# Patient Record
Sex: MALE | URBAN - METROPOLITAN AREA
[De-identification: names, ages, dates, MRNs, and addresses within clinical notes are randomized per-mention and may not be internally consistent; named-entity substitution may affect disease eponyms.]

---

## 2018-12-19 ENCOUNTER — APPOINTMENT (RX ONLY)
Dept: URBAN - METROPOLITAN AREA CLINIC 57 | Facility: CLINIC | Age: 53
Setting detail: DERMATOLOGY
End: 2018-12-19

## 2018-12-19 DIAGNOSIS — L57.8 OTHER SKIN CHANGES DUE TO CHRONIC EXPOSURE TO NONIONIZING RADIATION: ICD-10-CM

## 2018-12-19 DIAGNOSIS — L81.4 OTHER MELANIN HYPERPIGMENTATION: ICD-10-CM

## 2018-12-19 DIAGNOSIS — L30.8 OTHER SPECIFIED DERMATITIS: ICD-10-CM

## 2018-12-19 DIAGNOSIS — L91.8 OTHER HYPERTROPHIC DISORDERS OF THE SKIN: ICD-10-CM

## 2018-12-19 DIAGNOSIS — L21.8 OTHER SEBORRHEIC DERMATITIS: ICD-10-CM

## 2018-12-19 DIAGNOSIS — D22 MELANOCYTIC NEVI: ICD-10-CM

## 2018-12-19 PROBLEM — E13.9 OTHER SPECIFIED DIABETES MELLITUS WITHOUT COMPLICATIONS: Status: ACTIVE | Noted: 2018-12-19

## 2018-12-19 PROBLEM — L85.3 XEROSIS CUTIS: Status: ACTIVE | Noted: 2018-12-19

## 2018-12-19 PROBLEM — D22.5 MELANOCYTIC NEVI OF TRUNK: Status: ACTIVE | Noted: 2018-12-19

## 2018-12-19 PROBLEM — E78.5 HYPERLIPIDEMIA, UNSPECIFIED: Status: ACTIVE | Noted: 2018-12-19

## 2018-12-19 PROCEDURE — ? PRESCRIPTION

## 2018-12-19 PROCEDURE — ? COUNSELING

## 2018-12-19 PROCEDURE — 99213 OFFICE O/P EST LOW 20 MIN: CPT

## 2018-12-19 PROCEDURE — ? ADDITIONAL NOTES

## 2018-12-19 RX ORDER — KETOCONAZOLE 20 MG/G
CREAM TOPICAL BID
Qty: 1 | Refills: 6 | Status: ERX | COMMUNITY
Start: 2018-12-19

## 2018-12-19 RX ORDER — TRIAMCINOLONE ACETONIDE 1 MG/G
OINTMENT TOPICAL
Qty: 1 | Refills: 1 | Status: ERX | COMMUNITY
Start: 2018-12-19

## 2018-12-19 RX ADMIN — KETOCONAZOLE: 20 CREAM TOPICAL at 00:00

## 2018-12-19 RX ADMIN — TRIAMCINOLONE ACETONIDE 1: 1 OINTMENT TOPICAL at 00:00

## 2018-12-19 ASSESSMENT — LOCATION DETAILED DESCRIPTION DERM
LOCATION DETAILED: RIGHT RADIAL PALM
LOCATION DETAILED: PERIUMBILICAL SKIN
LOCATION DETAILED: SUPERIOR MID FOREHEAD
LOCATION DETAILED: LEFT RADIAL PALM
LOCATION DETAILED: INFERIOR THORACIC SPINE
LOCATION DETAILED: SUPERIOR THORACIC SPINE
LOCATION DETAILED: GLABELLA

## 2018-12-19 ASSESSMENT — LOCATION SIMPLE DESCRIPTION DERM
LOCATION SIMPLE: GLABELLA
LOCATION SIMPLE: SUPERIOR FOREHEAD
LOCATION SIMPLE: LEFT HAND
LOCATION SIMPLE: UPPER BACK
LOCATION SIMPLE: ABDOMEN
LOCATION SIMPLE: RIGHT HAND

## 2018-12-19 ASSESSMENT — LOCATION ZONE DERM
LOCATION ZONE: FACE
LOCATION ZONE: TRUNK
LOCATION ZONE: FACE
LOCATION ZONE: TRUNK
LOCATION ZONE: HAND

## 2018-12-19 NOTE — PROCEDURE: ADDITIONAL NOTES
Detail Level: Simple
Additional Notes: Patient is to apply Vaseline and gloves qhs when not flaring.

## 2019-03-20 ENCOUNTER — APPOINTMENT (RX ONLY)
Dept: URBAN - METROPOLITAN AREA CLINIC 56 | Facility: CLINIC | Age: 54
Setting detail: DERMATOLOGY
End: 2019-03-20

## 2019-03-20 DIAGNOSIS — L21.8 OTHER SEBORRHEIC DERMATITIS: ICD-10-CM

## 2019-03-20 PROBLEM — L30.9 DERMATITIS, UNSPECIFIED: Status: ACTIVE | Noted: 2019-03-20

## 2019-03-20 PROBLEM — K21.9 GASTRO-ESOPHAGEAL REFLUX DISEASE WITHOUT ESOPHAGITIS: Status: ACTIVE | Noted: 2019-03-20

## 2019-03-20 PROCEDURE — ? ADDITIONAL NOTES

## 2019-03-20 PROCEDURE — 99213 OFFICE O/P EST LOW 20 MIN: CPT

## 2019-03-20 PROCEDURE — ? COUNSELING

## 2019-03-20 ASSESSMENT — LOCATION ZONE DERM: LOCATION ZONE: FACE

## 2019-03-20 ASSESSMENT — LOCATION SIMPLE DESCRIPTION DERM: LOCATION SIMPLE: SUBMENTAL CHIN

## 2019-03-20 ASSESSMENT — LOCATION DETAILED DESCRIPTION DERM: LOCATION DETAILED: SUBMENTAL CHIN

## 2019-03-20 NOTE — PROCEDURE: ADDITIONAL NOTES
Additional Notes: Use a Triamcinolone 0.1% cream twice daily for one week and after one week,  use Ketoconazole 2% cream twice daily.
Detail Level: Simple

## 2019-05-29 ENCOUNTER — APPOINTMENT (RX ONLY)
Dept: URBAN - METROPOLITAN AREA CLINIC 56 | Facility: CLINIC | Age: 54
Setting detail: DERMATOLOGY
End: 2019-05-29

## 2019-05-29 DIAGNOSIS — L21.8 OTHER SEBORRHEIC DERMATITIS: ICD-10-CM

## 2019-05-29 DIAGNOSIS — M71 OTHER BURSOPATHIES: ICD-10-CM

## 2019-05-29 PROBLEM — M71.341 OTHER BURSAL CYST, RIGHT HAND: Status: ACTIVE | Noted: 2019-05-29

## 2019-05-29 PROBLEM — L30.9 DERMATITIS, UNSPECIFIED: Status: ACTIVE | Noted: 2019-05-29

## 2019-05-29 PROCEDURE — 11102 TANGNTL BX SKIN SINGLE LES: CPT

## 2019-05-29 PROCEDURE — ? INVENTORY

## 2019-05-29 PROCEDURE — 99213 OFFICE O/P EST LOW 20 MIN: CPT | Mod: 25

## 2019-05-29 PROCEDURE — ? BIOPSY BY SHAVE METHOD

## 2019-05-29 PROCEDURE — ? COUNSELING

## 2019-05-29 ASSESSMENT — LOCATION DETAILED DESCRIPTION DERM
LOCATION DETAILED: RIGHT CHIN
LOCATION DETAILED: RIGHT INDEX DISTAL INTERPHALANGEAL JOINT

## 2019-05-29 ASSESSMENT — LOCATION SIMPLE DESCRIPTION DERM
LOCATION SIMPLE: RIGHT INDEX FINGER
LOCATION SIMPLE: CHIN

## 2019-05-29 ASSESSMENT — LOCATION ZONE DERM
LOCATION ZONE: FINGER
LOCATION ZONE: FACE

## 2019-05-29 NOTE — PROCEDURE: MIPS QUALITY
Quality 402: Tobacco Use And Help With Quitting Among Adolescents: Patient screened for tobacco and is an ex-smoker
Quality 431: Preventive Care And Screening: Unhealthy Alcohol Use - Screening: Patient screened for unhealthy alcohol use using a single question and scores less than 2 times per year
Detail Level: Detailed
Quality 130: Documentation Of Current Medications In The Medical Record: Current Medications Documented

## 2019-05-29 NOTE — PROCEDURE: BIOPSY BY SHAVE METHOD
Additional Anesthesia Volume In Cc (Will Not Render If 0): 0
Render Post-Care Instructions In Note?: no
Cryotherapy Text: The wound bed was treated with cryotherapy after the biopsy was performed.
Lab Facility: [id number]
Depth Of Biopsy: dermis
Wound Care: Petrolatum
Electrodesiccation Text: The wound bed was treated with electrodesiccation after the biopsy was performed.
Biopsy Type: H and E
Anesthesia Volume In Cc (Will Not Render If 0): 0.5
Type Of Destruction Used: Electrodesiccation and Curettage
Electrodesiccation And Curettage Text: The wound bed was treated with electrodesiccation and curettage after the biopsy was performed.
Billing Type: Third-Party Bill
Post-Care Instructions: I reviewed with the patient in detail post-care instructions. Patient is to  cleanse the biopsy site with mild soap and water twice a day, and then apply Vaseline (petroleum jelly) twice daily until healed.
Was A Bandage Applied: Yes
Dressing: Band-Aid
Hemostasis: Drysol and Monsel's
Silver Nitrate Text: The wound bed was treated with silver nitrate after the biopsy was performed.
Notification Instructions: Patient will be notified of biopsy results. However, patient instructed to call the office if not contacted within 2 weeks.
Lab: [id number]
Biopsy Method: Personna blade
Detail Level: Detailed
Curettage Text: The wound bed was treated with curettage after the biopsy was performed.
Anesthesia Type: 1% lidocaine with 1:200,000 epinephrine
Consent: Written consent was obtained and risks were reviewed including but not limited to scarring, infection, bleeding, scabbing, incomplete removal, nerve damage and allergy to anesthesia.

## 2019-06-10 ENCOUNTER — RX ONLY (OUTPATIENT)
Age: 54
Setting detail: RX ONLY
End: 2019-06-10

## 2019-06-10 RX ORDER — KETOCONAZOLE 20.5 MG/ML
SHAMPOO, SUSPENSION TOPICAL
Qty: 1 | Refills: 6 | Status: ERX | COMMUNITY
Start: 2019-06-10

## 2019-06-10 RX ORDER — FLUCONAZOLE 200 MG/1
TABLET ORAL
Qty: 4 | Refills: 0 | Status: ERX | COMMUNITY
Start: 2019-06-10

## 2019-06-18 ENCOUNTER — RX ONLY (OUTPATIENT)
Age: 54
Setting detail: RX ONLY
End: 2019-06-18

## 2019-06-18 RX ORDER — EMOLLIENT COMBINATION NO.53
CREAM (GRAM) TOPICAL
Qty: 1 | Refills: 6 | Status: ERX | COMMUNITY
Start: 2019-06-18

## 2019-06-18 RX ORDER — KETOCONAZOLE 20.5 MG/ML
SHAMPOO, SUSPENSION TOPICAL
Qty: 1 | Refills: 6 | Status: ERX

## 2019-06-18 RX ORDER — FLUCONAZOLE 200 MG/1
TABLET ORAL
Qty: 4 | Refills: 0 | Status: ERX

## 2020-04-24 ENCOUNTER — RX ONLY (OUTPATIENT)
Age: 55
Setting detail: RX ONLY
End: 2020-04-24

## 2020-04-24 RX ORDER — KETOCONAZOLE 20 MG/G
1 CREAM TOPICAL BID
Qty: 1 | Refills: 6 | Status: ERX

## 2021-01-13 ENCOUNTER — APPOINTMENT (RX ONLY)
Dept: URBAN - METROPOLITAN AREA CLINIC 57 | Facility: CLINIC | Age: 56
Setting detail: DERMATOLOGY
End: 2021-01-13

## 2021-01-13 DIAGNOSIS — L21.8 OTHER SEBORRHEIC DERMATITIS: ICD-10-CM | Status: WORSENING

## 2021-01-13 PROCEDURE — ? ADDITIONAL NOTES

## 2021-01-13 PROCEDURE — ? FULL BODY SKIN EXAM - DECLINED

## 2021-01-13 PROCEDURE — ? COUNSELING

## 2021-01-13 PROCEDURE — ? PRESCRIPTION

## 2021-01-13 PROCEDURE — 99214 OFFICE O/P EST MOD 30 MIN: CPT

## 2021-01-13 RX ORDER — KETOCONAZOLE 20 MG/ML
SHAMPOO, SUSPENSION TOPICAL
Qty: 1 | Refills: 6 | Status: ERX

## 2021-01-13 RX ORDER — KETOCONAZOLE 20 MG/G
CREAM TOPICAL
Qty: 1 | Refills: 6 | Status: ERX

## 2021-01-13 ASSESSMENT — LOCATION DETAILED DESCRIPTION DERM: LOCATION DETAILED: SUBMENTAL CHIN

## 2021-01-13 ASSESSMENT — LOCATION ZONE DERM: LOCATION ZONE: FACE

## 2021-01-13 ASSESSMENT — LOCATION SIMPLE DESCRIPTION DERM: LOCATION SIMPLE: SUBMENTAL CHIN

## 2021-01-13 NOTE — PROCEDURE: COUNSELING
Patient Specific Counseling (Will Not Stick From Patient To Patient): - Previous biopsy consistent with seborrheic dermatitis\\n- continue ketoconazole 2% shampoo in rotation with other OTC shampoos with different active ingredients including zinc, selenium sulfide, salicylic acid, and tar\\n- continue ketoconazole 2% cream BID\\n- discussed option of starting oral pulsed fluconazole and discussed SE including liver dysfunction. Reports history of fatty liver disease and will discuss this option with his primary care. If PCP approves then will inform our office so rx can be sent.\\n- discussed that severe cases can be seen in those with neurologic abnormalities or immunodeficiency. Discussed would recommend lab work up if fails to improve in the future such as HIV and RPR testing. He denies at this time as reports had testing in his youth and since has had a single sexual partner his wife with no other sexual contacts.
Detail Level: Detailed

## 2021-01-13 NOTE — HPI: RASH
What Type Of Note Output Would You Prefer (Optional)?: Bullet Format
Is The Patient Presenting As Previously Scheduled?: Yes
How Severe Is Your Rash?: mild
Is This A New Presentation, Or A Follow-Up?: Rash
Additional History: Pt states that the topicals help to an extent. Pt states that the rash worsens when he shaves his gonzalez. Pt states that the rash is beginning to spreading around the area his masks sit.

## 2021-03-03 ENCOUNTER — APPOINTMENT (RX ONLY)
Dept: URBAN - METROPOLITAN AREA CLINIC 56 | Facility: CLINIC | Age: 56
Setting detail: DERMATOLOGY
End: 2021-03-03

## 2021-03-03 DIAGNOSIS — L29.8 OTHER PRURITUS: ICD-10-CM

## 2021-03-03 DIAGNOSIS — L21.8 OTHER SEBORRHEIC DERMATITIS: ICD-10-CM

## 2021-03-03 DIAGNOSIS — L29.89 OTHER PRURITUS: ICD-10-CM

## 2021-03-03 DIAGNOSIS — H01.13 ECZEMATOUS DERMATITIS OF EYELID: ICD-10-CM

## 2021-03-03 PROBLEM — H01.131 ECZEMATOUS DERMATITIS OF RIGHT UPPER EYELID: Status: ACTIVE | Noted: 2021-03-03

## 2021-03-03 PROCEDURE — ? PRESCRIPTION

## 2021-03-03 PROCEDURE — ? COUNSELING

## 2021-03-03 PROCEDURE — ? ADDITIONAL NOTES

## 2021-03-03 PROCEDURE — ? TREATMENT REGIMEN

## 2021-03-03 PROCEDURE — 99214 OFFICE O/P EST MOD 30 MIN: CPT

## 2021-03-03 RX ORDER — TRIAMCINOLONE ACETONIDE 1 MG/G
1 OINTMENT TOPICAL BID
Qty: 1 | Refills: 3 | Status: ERX

## 2021-03-03 RX ORDER — KETOCONAZOLE 20 MG/ML
1 SHAMPOO, SUSPENSION TOPICAL
Qty: 1 | Refills: 6 | Status: ERX | COMMUNITY
Start: 2021-03-03

## 2021-03-03 RX ORDER — KETOCONAZOLE 20 MG/G
1 CREAM TOPICAL BID
Qty: 1 | Refills: 6 | Status: ERX | COMMUNITY
Start: 2021-03-03

## 2021-03-03 RX ADMIN — KETOCONAZOLE 1: 20 SHAMPOO, SUSPENSION TOPICAL at 00:00

## 2021-03-03 RX ADMIN — KETOCONAZOLE 1: 20 CREAM TOPICAL at 00:00

## 2021-03-03 ASSESSMENT — LOCATION ZONE DERM
LOCATION ZONE: LEG
LOCATION ZONE: EYELID
LOCATION ZONE: FACE

## 2021-03-03 ASSESSMENT — LOCATION DETAILED DESCRIPTION DERM
LOCATION DETAILED: RIGHT CHIN
LOCATION DETAILED: RIGHT ANTERIOR DISTAL THIGH
LOCATION DETAILED: RIGHT LATERAL SUPERIOR EYELID

## 2021-03-03 ASSESSMENT — LOCATION SIMPLE DESCRIPTION DERM
LOCATION SIMPLE: RIGHT THIGH
LOCATION SIMPLE: RIGHT SUPERIOR EYELID
LOCATION SIMPLE: CHIN

## 2021-03-03 NOTE — PROCEDURE: TREATMENT REGIMEN
Detail Level: Zone
Plan: May use triamcinolone 0.1% ointment twice a day for two weeks.
Plan: Recommend triamcinolone 0.1% ointment twice a day for two weeks.
Plan: Continue using Ketoconazole 2% cream everyday and only use triamcinolone 0.1% ointment twice a day for two weeks as needed for flare ups. May continue Ketoconazole 2% shampoo everyday.

## 2021-03-31 ENCOUNTER — APPOINTMENT (RX ONLY)
Dept: URBAN - METROPOLITAN AREA CLINIC 56 | Facility: CLINIC | Age: 56
Setting detail: DERMATOLOGY
End: 2021-03-31

## 2021-03-31 DIAGNOSIS — H01.13 ECZEMATOUS DERMATITIS OF EYELID: ICD-10-CM | Status: WELL CONTROLLED

## 2021-03-31 DIAGNOSIS — L21.8 OTHER SEBORRHEIC DERMATITIS: ICD-10-CM | Status: STABLE

## 2021-03-31 DIAGNOSIS — L29.8 OTHER PRURITUS: ICD-10-CM

## 2021-03-31 DIAGNOSIS — L29.89 OTHER PRURITUS: ICD-10-CM

## 2021-03-31 PROBLEM — H01.131 ECZEMATOUS DERMATITIS OF RIGHT UPPER EYELID: Status: ACTIVE | Noted: 2021-03-31

## 2021-03-31 PROCEDURE — ? COUNSELING

## 2021-03-31 PROCEDURE — ? ADDITIONAL NOTES

## 2021-03-31 PROCEDURE — ? PRESCRIPTION

## 2021-03-31 PROCEDURE — ? TREATMENT REGIMEN

## 2021-03-31 PROCEDURE — 99213 OFFICE O/P EST LOW 20 MIN: CPT

## 2021-03-31 RX ORDER — KETOCONAZOLE 20 MG/ML
1 SHAMPOO, SUSPENSION TOPICAL
Qty: 1 | Refills: 6 | Status: ERX

## 2021-03-31 ASSESSMENT — LOCATION ZONE DERM
LOCATION ZONE: EYELID
LOCATION ZONE: FACE
LOCATION ZONE: LEG

## 2021-03-31 ASSESSMENT — LOCATION DETAILED DESCRIPTION DERM
LOCATION DETAILED: RIGHT LATERAL SUPERIOR EYELID
LOCATION DETAILED: RIGHT ANTERIOR DISTAL THIGH
LOCATION DETAILED: RIGHT CHIN

## 2021-03-31 ASSESSMENT — LOCATION SIMPLE DESCRIPTION DERM
LOCATION SIMPLE: RIGHT THIGH
LOCATION SIMPLE: RIGHT SUPERIOR EYELID
LOCATION SIMPLE: CHIN

## 2021-03-31 NOTE — PROCEDURE: TREATMENT REGIMEN
Plan: Continue using Ketoconazole 2% cream everyday and only use triamcinolone 0.1% ointment twice a day for two weeks as needed for flare ups. May continue Ketoconazole 2% shampoo everyday.
Detail Level: Zone
Plan: May use triamcinolone 0.1% ointment twice a day for two weeks.
Plan: Recommend triamcinolone 0.1% ointment twice a day for two weeks prn flares on the right knee and left elbow